# Patient Record
Sex: MALE | Race: WHITE | URBAN - METROPOLITAN AREA
[De-identification: names, ages, dates, MRNs, and addresses within clinical notes are randomized per-mention and may not be internally consistent; named-entity substitution may affect disease eponyms.]

---

## 2024-06-13 ENCOUNTER — APPOINTMENT (OUTPATIENT)
Dept: URBAN - METROPOLITAN AREA CLINIC 252 | Age: 24
Setting detail: DERMATOLOGY
End: 2024-06-13

## 2024-06-13 VITALS — WEIGHT: 120 LBS | HEIGHT: 68 IN

## 2024-06-13 DIAGNOSIS — L65.9 NONSCARRING HAIR LOSS, UNSPECIFIED: ICD-10-CM

## 2024-06-13 PROCEDURE — 99203 OFFICE O/P NEW LOW 30 MIN: CPT

## 2024-06-13 PROCEDURE — OTHER COUNSELING: OTHER

## 2024-06-13 PROCEDURE — OTHER MIPS QUALITY: OTHER

## 2024-06-13 PROCEDURE — OTHER ADDITIONAL NOTES: OTHER

## 2024-06-13 ASSESSMENT — LOCATION SIMPLE DESCRIPTION DERM: LOCATION SIMPLE: ANTERIOR SCALP

## 2024-06-13 ASSESSMENT — LOCATION ZONE DERM: LOCATION ZONE: SCALP

## 2024-06-13 ASSESSMENT — LOCATION DETAILED DESCRIPTION DERM: LOCATION DETAILED: MID-FRONTAL SCALP

## 2024-06-13 NOTE — HPI: HAIR LOSS
Additional History: Used minoxidil which made hair loss worse. He reports he has a depressive episode in August 2022 that resolved after 6 months. He reports some hair regrowth. Started rogaine at the beginning the episode and stopped  after about 2-3 months. Last used rogaine 1.5 years ago.   He reports not actively losing hair but has not yet regained hair.   He denies any pther medical problem.  Maternal grandfather has hair loss. No hairless patches. No itch.

## 2024-06-13 NOTE — PROCEDURE: ADDITIONAL NOTES
Render Risk Assessment In Note?: no
Additional Notes: -Counseled patient that rogaine does not cause scarring hair loss or permanent hair loss. Next steps could be considering to come back to rogaine, expecting temporary hair growth recession before seeing improvement.\\n\\n-Discussed Oral form minoxidil vs. pills \\n\\n-Hair loss lab info sheet with what we screen for and cpt codes given, for patient to evaluate cost with insurance before RTC.\\n\\n-Recommended if deficiency is ruled out in blood work to do bx next for specific diagnosis.\\n\\n-FU Nurse visit for lab work if patient agrees to have lab draw
Detail Level: Simple

## 2024-06-13 NOTE — PROCEDURE: COUNSELING
Detail Level: Zone
Patient Specific Counseling (Will Not Stick From Patient To Patient): - Discussed that hair loss can be complicated, multifactorial, and resistant to treatment.\\n- Discussed the potential causes of hair loss, and discussed the options of dual scalp punch biopsies and a screening panel of blood work  (DHEAS, ferritin, H&H, K+, TSH, T3, T4, testosterone, prolactin, sex hormone binding globulin, vitamin B12, vitamin D.)\\n- Advised that finding a lab abnormality does not guarantee cause nor that correction of any abnormality will result in the correction of hair loss.\\n- Discussed the potential out of pocket cost for this blood work should this not be a covered benefit or if they have a high deductible insurance plan.\\n- Blood work price approximation sheet given to patient. Advised that the costs listed on the sheet is an approximation and may underestimate the actual cost.\\n- Also discussed the value of 2 scalp punch biopsies and advised that this will give us different information than what the blood work is able to.\\n- Advised that favorable results with hair loss treatment and a firm diagnosis can never be guaranteed despite any work-up.\\n- The patient expressed understanding.\\n- advised that is is normal to lose hair before regrowing when starting minoxidil, but since it has been so long since using whatever hair loss was a result of the minoxidil is not the current cause of his thinner hair. Recommended waiting until  we get the blood results and if needed biopsies before changing regimen or restarting minoxidil. Patient expressed understanding.